# Patient Record
Sex: FEMALE
[De-identification: names, ages, dates, MRNs, and addresses within clinical notes are randomized per-mention and may not be internally consistent; named-entity substitution may affect disease eponyms.]

---

## 2023-07-11 PROBLEM — Z00.00 ENCOUNTER FOR PREVENTIVE HEALTH EXAMINATION: Status: ACTIVE | Noted: 2023-07-11

## 2023-07-12 ENCOUNTER — APPOINTMENT (OUTPATIENT)
Dept: OTOLARYNGOLOGY | Facility: CLINIC | Age: 38
End: 2023-07-12
Payer: COMMERCIAL

## 2023-07-12 DIAGNOSIS — Z87.891 PERSONAL HISTORY OF NICOTINE DEPENDENCE: ICD-10-CM

## 2023-07-12 DIAGNOSIS — H92.02 OTALGIA, LEFT EAR: ICD-10-CM

## 2023-07-12 DIAGNOSIS — Z80.9 FAMILY HISTORY OF MALIGNANT NEOPLASM, UNSPECIFIED: ICD-10-CM

## 2023-07-12 DIAGNOSIS — M26.609 UNSPECIFIED TEMPOROMANDIBULAR JOINT DISORDER: ICD-10-CM

## 2023-07-12 DIAGNOSIS — Z78.9 OTHER SPECIFIED HEALTH STATUS: ICD-10-CM

## 2023-07-12 PROCEDURE — 99203 OFFICE O/P NEW LOW 30 MIN: CPT

## 2023-07-12 RX ORDER — NORETHINDRONE ACETATE AND ETHINYL ESTRADIOL AND FERROUS FUMARATE 1.5-30(21)
KIT ORAL
Refills: 0 | Status: ACTIVE | COMMUNITY

## 2023-07-12 NOTE — HISTORY OF PRESENT ILLNESS
[de-identified] : RAQUEL CHI is a 38 year old patient referred by Dr. Em for a 2-month history of intermittent left ear pain.  She did not have a preceding event.  She has no otorrhea, dizziness, or ear pressure.  She has a history of tinnitus.  She has a history of TMJ dysfunction.  She is doing Invisalign.  She receives Botox injections.  She has no nasal or throat symptoms.\par \par No history of recurrent ear infections, prior otologic surgery, ear trauma, or excessive noise exposure.\par She said her annual audiograms have been normal\par She is a former smoker

## 2023-07-12 NOTE — ASSESSMENT
[FreeTextEntry1] : She has a history of left otalgia.  Her ears are normal on exam.  This is referred otalgia due to TMJ dysfunction.  She also has back and neck pain.  Flexible laryngoscopy did not show any lesions.  There were laryngeal changes that suggest mild reflux\par \par Plan\par -Findings and management options were discussed with the patient.\par -Good ear hygiene reviewed\par -Continue treatment for TMJ dysfunction.  She may benefit from physical therapy.\par -Consider physiatry evaluation. i have given her the Dr. Hood's information .\par -She is concerned because her father was recently diagnosed with cancer.  She would like to go for CT scan of the neck with contrast to rule out lesions.  She will be sent for this.  \par -Reflux precautions.  She was given literature\par -I will see her back after the CT scan\par -She should call and return earlier if any concerns or worsening symptoms

## 2023-07-12 NOTE — CONSULT LETTER
[Dear  ___] : Dear  [unfilled], [Consult Letter:] : I had the pleasure of evaluating your patient, [unfilled]. [Please see my note below.] : Please see my note below. [Consult Closing:] : Thank you very much for allowing me to participate in the care of this patient.  If you have any questions, please do not hesitate to contact me. [Sincerely,] : Sincerely, [FreeTextEntry3] : Megan Lilly MD\par [facility]\par Otolaryngology – Head & Neck Surgery\par [facility]\par \par \par Department of Otolaryngology\par [facility]\par \par Office Tel: [phone]\par

## 2023-07-19 ENCOUNTER — APPOINTMENT (OUTPATIENT)
Dept: CT IMAGING | Facility: CLINIC | Age: 38
End: 2023-07-19